# Patient Record
Sex: FEMALE | Race: WHITE | NOT HISPANIC OR LATINO | URBAN - METROPOLITAN AREA
[De-identification: names, ages, dates, MRNs, and addresses within clinical notes are randomized per-mention and may not be internally consistent; named-entity substitution may affect disease eponyms.]

---

## 2017-01-12 ENCOUNTER — HOSPITAL ENCOUNTER (OUTPATIENT)
Dept: MAMMOGRAPHY | Facility: CLINIC | Age: 42
Discharge: HOME | End: 2017-01-12
Attending: FAMILY MEDICINE

## 2017-01-12 DIAGNOSIS — Z00.00 ROUTINE GENERAL MEDICAL EXAMINATION AT A HEALTH CARE FACILITY: ICD-10-CM

## 2017-11-18 ENCOUNTER — AMBULATORY - HEALTHEAST (OUTPATIENT)
Dept: NURSING | Facility: CLINIC | Age: 42
End: 2017-11-18

## 2018-01-24 ENCOUNTER — OFFICE VISIT - HEALTHEAST (OUTPATIENT)
Dept: FAMILY MEDICINE | Facility: CLINIC | Age: 43
End: 2018-01-24

## 2018-01-24 DIAGNOSIS — M43.10 ACQUIRED SPONDYLOLISTHESIS: ICD-10-CM

## 2018-01-24 DIAGNOSIS — E55.9 VITAMIN D DEFICIENCY: ICD-10-CM

## 2018-01-24 DIAGNOSIS — J45.990 BRONCHOSPASM, EXERCISE-INDUCED: ICD-10-CM

## 2018-01-24 DIAGNOSIS — Z00.00 ROUTINE GENERAL MEDICAL EXAMINATION AT A HEALTH CARE FACILITY: ICD-10-CM

## 2018-01-24 DIAGNOSIS — D22.9 COMPOUND NEVUS: ICD-10-CM

## 2018-01-24 LAB
ANION GAP SERPL CALCULATED.3IONS-SCNC: 11 MMOL/L (ref 5–18)
BUN SERPL-MCNC: 9 MG/DL (ref 8–22)
CALCIUM SERPL-MCNC: 9.6 MG/DL (ref 8.5–10.5)
CHLORIDE BLD-SCNC: 106 MMOL/L (ref 98–107)
CHOLEST SERPL-MCNC: 180 MG/DL
CO2 SERPL-SCNC: 25 MMOL/L (ref 22–31)
CREAT SERPL-MCNC: 0.8 MG/DL (ref 0.6–1.1)
FASTING STATUS PATIENT QL REPORTED: NO
GFR SERPL CREATININE-BSD FRML MDRD: >60 ML/MIN/1.73M2
GLUCOSE BLD-MCNC: 86 MG/DL (ref 70–125)
HDLC SERPL-MCNC: 53 MG/DL
LDLC SERPL CALC-MCNC: 108 MG/DL
POTASSIUM BLD-SCNC: 3.9 MMOL/L (ref 3.5–5)
SODIUM SERPL-SCNC: 142 MMOL/L (ref 136–145)
TRIGL SERPL-MCNC: 97 MG/DL

## 2018-01-24 ASSESSMENT — MIFFLIN-ST. JEOR: SCORE: 1292.15

## 2018-01-25 ENCOUNTER — AMBULATORY - HEALTHEAST (OUTPATIENT)
Dept: FAMILY MEDICINE | Facility: CLINIC | Age: 43
End: 2018-01-25

## 2018-01-25 ENCOUNTER — COMMUNICATION - HEALTHEAST (OUTPATIENT)
Dept: FAMILY MEDICINE | Facility: CLINIC | Age: 43
End: 2018-01-25

## 2018-01-25 DIAGNOSIS — D22.5 COMPOUND NEVUS OF BACK: ICD-10-CM

## 2018-01-25 LAB — 25(OH)D3 SERPL-MCNC: 32.7 NG/ML (ref 30–80)

## 2018-02-13 ENCOUNTER — COMMUNICATION - HEALTHEAST (OUTPATIENT)
Dept: FAMILY MEDICINE | Facility: CLINIC | Age: 43
End: 2018-02-13

## 2018-03-08 ENCOUNTER — RECORDS - HEALTHEAST (OUTPATIENT)
Dept: ADMINISTRATIVE | Facility: OTHER | Age: 43
End: 2018-03-08

## 2018-03-22 ENCOUNTER — HOSPITAL ENCOUNTER (OUTPATIENT)
Dept: MAMMOGRAPHY | Facility: CLINIC | Age: 43
Discharge: HOME | End: 2018-03-22
Attending: FAMILY MEDICINE

## 2018-03-22 DIAGNOSIS — Z12.31 VISIT FOR SCREENING MAMMOGRAM: ICD-10-CM

## 2018-03-30 ENCOUNTER — HOSPITAL ENCOUNTER (OUTPATIENT)
Dept: MAMMOGRAPHY | Facility: CLINIC | Age: 43
Discharge: HOME | End: 2018-03-30
Attending: FAMILY MEDICINE

## 2018-03-30 DIAGNOSIS — R92.30 BREAST DENSITY: ICD-10-CM

## 2018-06-27 ENCOUNTER — OFFICE VISIT - HEALTHEAST (OUTPATIENT)
Dept: FAMILY MEDICINE | Facility: CLINIC | Age: 43
End: 2018-06-27

## 2018-06-27 DIAGNOSIS — R30.0 DYSURIA: ICD-10-CM

## 2018-06-27 LAB
ALBUMIN UR-MCNC: NEGATIVE MG/DL
APPEARANCE UR: CLEAR
BACTERIA #/AREA URNS HPF: NORMAL HPF
BILIRUB UR QL STRIP: NEGATIVE
COLOR UR AUTO: YELLOW
GLUCOSE UR STRIP-MCNC: NEGATIVE MG/DL
HGB UR QL STRIP: NEGATIVE
KETONES UR STRIP-MCNC: NEGATIVE MG/DL
LEUKOCYTE ESTERASE UR QL STRIP: NEGATIVE
NITRATE UR QL: NEGATIVE
PH UR STRIP: 6 [PH] (ref 5–8)
RBC #/AREA URNS AUTO: NORMAL HPF
SP GR UR STRIP: 1.01 (ref 1–1.03)
SQUAMOUS #/AREA URNS AUTO: NORMAL LPF
UROBILINOGEN UR STRIP-ACNC: NORMAL
WBC #/AREA URNS AUTO: NORMAL HPF

## 2018-06-28 ENCOUNTER — COMMUNICATION - HEALTHEAST (OUTPATIENT)
Dept: FAMILY MEDICINE | Facility: CLINIC | Age: 43
End: 2018-06-28

## 2018-06-28 LAB — BACTERIA SPEC CULT: NO GROWTH

## 2019-06-06 ENCOUNTER — COMMUNICATION - HEALTHEAST (OUTPATIENT)
Dept: FAMILY MEDICINE | Facility: CLINIC | Age: 44
End: 2019-06-06

## 2021-03-01 ENCOUNTER — RECORDS - HEALTHEAST (OUTPATIENT)
Dept: ADMINISTRATIVE | Facility: OTHER | Age: 46
End: 2021-03-01

## 2021-06-01 VITALS — HEIGHT: 63 IN | BODY MASS INDEX: 26.13 KG/M2 | WEIGHT: 147.5 LBS

## 2021-06-01 VITALS — BODY MASS INDEX: 25.61 KG/M2 | WEIGHT: 145.7 LBS

## 2021-06-02 ENCOUNTER — RECORDS - HEALTHEAST (OUTPATIENT)
Dept: ADMINISTRATIVE | Facility: CLINIC | Age: 46
End: 2021-06-02
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
## 2021-06-18 NOTE — PROGRESS NOTES
Chief complaint: Dysuria    HPI: The patient reports some burning or tingling after urination that has been going on since the second week of May off and on.  She does not have burning with urination and she does not have frequency urgency pain or blood in her urine.  She has never had a urinary tract infection.  She is quite sure that this does not have a vaginal source and she denies any vaginal discharge or issues.  She and her  have been monogamous and together for 16 years.  She has no back pain or any other abdominal symptoms and she does not have a fever.    Objective:/66 (Patient Site: Right Arm, Patient Position: Sitting, Cuff Size: Adult Regular)  Pulse 64  Wt 145 lb 11.2 oz (66.1 kg)  LMP 06/16/2018 (Exact Date)  Breastfeeding? No  BMI 25.61 kg/m2  Recent Results (from the past 24 hour(s))   Urinalysis Macro & Micro   Result Value Ref Range    Color, UA Yellow Colorless, Yellow, Straw, Light Yellow    Clarity, UA Clear Clear    Glucose, UA Negative Negative    Bilirubin, UA Negative Negative    Ketones, UA Negative Negative    Specific Gravity, UA 1.010 1.005 - 1.030    Blood, UA Negative Negative    pH, UA 6.0 5.0 - 8.0    Protein, UA Negative Negative mg/dL    Urobilinogen, UA 0.2 E.U./dL 0.2 E.U./dL, 1.0 E.U./dL    Nitrite, UA Negative Negative    Leukocytes, UA Negative Negative    Bacteria, UA None Seen None Seen hpf    RBC, UA None Seen None Seen, 0-2 hpf    WBC, UA None Seen None Seen, 0-5 hpf    Squam Epithel, UA None Seen None Seen, 0-5 lpf     We discussed that it may be that the urine is too dilute to discern anything so I will have the urine sent for culture and she will be notified of those results.    Assessment: Dysuria    Plan: She will be notified the results and treat accordingly.  If the urine culture is negative she may have to pursue some sort of vaginal exam or wet prep.  She had a Pap in December 2016 and was told that she was good for 5 years.  I would be more  likely to lean towards 3 years but anyway she was not planning on having a Pap smear anytime soon.    Addendum: She then was complaining of cold and upper respiratory symptoms and wanted to add that on to her visit today after we discussed her urine.  She is having right-sided ear pain.  On exam her conjunctiva are clear and the TM on the left was congested TM on the right was unable to be visualized due to cerumen impaction.  Throat is clear neck is supple and she did not have facial tenderness so I do not have a good explanation since I cannot see her ears have suggested she get Debrox earwax removal kit and see if she still has pain after she gets the wax out.    She then wanted to talk about her anxiety because she said that she mentioned it to me 6 months ago when I met her at her visit and she could not get in until 12 July so I told her that that is an entirely separate visit to talk about whether or not she needs medication and/or counseling that we could not do that and add that onto a visit for urinary tract infection.  When we tried to schedule her an appointment because of her schedule and other things that she had she was unable to be seen until after that date anyway.

## 2021-06-19 NOTE — LETTER
Letter by Sil Stephenson MD at      Author: Sil Stephenson MD Service: -- Author Type: --    Filed:  Encounter Date: 6/6/2019 Status: (Other)         Alysia COLTON Ronn  [address] [place] [address]             June 6, 2019         Dear Ms. Brody,    We are reviewing records and it shows that you have not completed a Asthma Control Test within the past year.  These are filled out for patients that have or have had a diagnosis of asthma or or were prescribed an inhaler for another reason.  If you do not currently have asthma, we would still appreciate you filling the form out and mailing it back to us.      If you have any questions, please feel free to message us through my chart or call the clinic at [phone].      Please call with questions or contact us using [employer].    Sincerely,        Electronically signed by Sil Stephenson MD

## 2021-08-21 ENCOUNTER — HEALTH MAINTENANCE LETTER (OUTPATIENT)
Age: 46
End: 2021-08-21

## 2021-10-16 ENCOUNTER — HEALTH MAINTENANCE LETTER (OUTPATIENT)
Age: 46
End: 2021-10-16

## 2022-09-25 ENCOUNTER — HEALTH MAINTENANCE LETTER (OUTPATIENT)
Age: 47
End: 2022-09-25

## 2023-10-14 ENCOUNTER — HEALTH MAINTENANCE LETTER (OUTPATIENT)
Age: 48
End: 2023-10-14